# Patient Record
Sex: MALE | Race: WHITE
[De-identification: names, ages, dates, MRNs, and addresses within clinical notes are randomized per-mention and may not be internally consistent; named-entity substitution may affect disease eponyms.]

---

## 2018-05-25 ENCOUNTER — HOSPITAL ENCOUNTER (OUTPATIENT)
Dept: HOSPITAL 88 - ENDO | Age: 58
Discharge: HOME | End: 2018-05-25
Attending: INTERNAL MEDICINE
Payer: COMMERCIAL

## 2018-05-25 DIAGNOSIS — I10: ICD-10-CM

## 2018-05-25 DIAGNOSIS — K64.8: ICD-10-CM

## 2018-05-25 DIAGNOSIS — K92.1: ICD-10-CM

## 2018-05-25 DIAGNOSIS — K63.5: ICD-10-CM

## 2018-05-25 DIAGNOSIS — Z12.11: Primary | ICD-10-CM

## 2018-05-25 DIAGNOSIS — Z86.19: ICD-10-CM

## 2018-05-25 DIAGNOSIS — K57.30: ICD-10-CM

## 2018-05-25 DIAGNOSIS — Z01.810: ICD-10-CM

## 2018-05-25 DIAGNOSIS — R00.1: ICD-10-CM

## 2018-05-25 DIAGNOSIS — K62.1: ICD-10-CM

## 2018-05-25 PROCEDURE — 45384 COLONOSCOPY W/LESION REMOVAL: CPT

## 2018-05-25 PROCEDURE — 45385 COLONOSCOPY W/LESION REMOVAL: CPT

## 2018-05-25 PROCEDURE — 45378 DIAGNOSTIC COLONOSCOPY: CPT

## 2018-05-25 PROCEDURE — 93005 ELECTROCARDIOGRAM TRACING: CPT

## 2018-05-25 NOTE — OPERATIVE REPORT
DATE OF PROCEDURE:  May 25, 2018 



REFERRING PHYSICIAN:  Dr. Andrzej Harmon. 



PROCEDURE PERFORMED:  Colonoscopy and polypectomy.



INDICATIONS FOR COLONOSCOPY:  Colorectal cancer screening, history of 

bright red blood per rectum.



MEDICATION:  Patient was done under MAC.  Please see anesthesiologist's 

note.



PROCEDURE:  With the patient in the left lateral decubitus position, the 

flexible fiberoptic Olympus colonoscope was inserted into the rectum with 

ease and advanced all the way to the cecum.  It was then withdrawn slowly.  

Mucosa overlying the cecum and ascending colon grossly appeared to be 

within normal limits.  Diverticular disease was noted to be scattered 

throughout and more prominent in the descending and the sigmoid colon.  

Nine polyps were removed from the sigmoid colon; 3 were snared, and 6 were 

hot biopsied.  Three polyps were hot biopsied in the rectum.  The scope was 

then retroflexed into the distal rectum and large internal hemorrhoids were 

noted, none of which was actively bleeding.  The scope was then 

straightened out.  It was subsequently withdrawn.  Patient tolerated the 

procedure well.



IMPRESSION:  

1. Pandiverticulosis.

2. Sigmoid colon polyps times 9, 3 snared and 6 hot biopsied.

3. Rectal polyps times 3 hot biopsied.

4. Large internal hemorrhoids.



PLAN:  Follow up histology.  Initiate high-fiber low-fat diet.  Initiate 

high-fiber supplement.  

Start hydrocortisone suppository 25 mg b.i.d. times 10 days, then p.r.n.  

Patient will need a followup colonoscopy in 3 years.  











DD:  05/25/2018 11:37

DT:  05/25/2018 12:31

Job#:  [id number]

cc:ANDRZEJ HARMON DO